# Patient Record
Sex: MALE | Race: WHITE
[De-identification: names, ages, dates, MRNs, and addresses within clinical notes are randomized per-mention and may not be internally consistent; named-entity substitution may affect disease eponyms.]

---

## 2017-06-14 ENCOUNTER — HOSPITAL ENCOUNTER (OUTPATIENT)
Dept: HOSPITAL 47 - MMGSC | Age: 42
End: 2017-06-14
Payer: MEDICAID

## 2017-06-14 DIAGNOSIS — L73.9: Primary | ICD-10-CM

## 2017-06-14 PROCEDURE — 87205 SMEAR GRAM STAIN: CPT

## 2017-06-14 PROCEDURE — 87070 CULTURE OTHR SPECIMN AEROBIC: CPT

## 2017-08-09 ENCOUNTER — HOSPITAL ENCOUNTER (OUTPATIENT)
Dept: HOSPITAL 47 - MMGSC | Age: 42
Discharge: HOME | End: 2017-08-09
Payer: COMMERCIAL

## 2017-08-09 DIAGNOSIS — R63.5: ICD-10-CM

## 2017-08-09 DIAGNOSIS — R79.89: Primary | ICD-10-CM

## 2017-08-09 LAB
ALP SERPL-CCNC: 48 U/L (ref 38–126)
ALT SERPL-CCNC: 57 U/L (ref 21–72)
ANION GAP SERPL CALC-SCNC: 13 MMOL/L
AST SERPL-CCNC: 32 U/L (ref 17–59)
BUN SERPL-SCNC: 15 MG/DL (ref 9–20)
CALCIUM SPEC-MCNC: 10.1 MG/DL (ref 8.4–10.2)
CHLORIDE SERPL-SCNC: 103 MMOL/L (ref 98–107)
CO2 SERPL-SCNC: 24 MMOL/L (ref 22–30)
GLUCOSE SERPL-MCNC: 87 MG/DL (ref 74–99)
NON-AFRICAN AMERICAN GFR(MDRD): >60
POTASSIUM SERPL-SCNC: 3.9 MMOL/L (ref 3.5–5.1)
PROT SERPL-MCNC: 7.6 G/DL (ref 6.3–8.2)
SODIUM SERPL-SCNC: 140 MMOL/L (ref 137–145)

## 2017-08-09 PROCEDURE — 84443 ASSAY THYROID STIM HORMONE: CPT

## 2017-08-09 PROCEDURE — 36415 COLL VENOUS BLD VENIPUNCTURE: CPT

## 2017-08-09 PROCEDURE — 80053 COMPREHEN METABOLIC PANEL: CPT

## 2017-08-09 PROCEDURE — 84439 ASSAY OF FREE THYROXINE: CPT

## 2019-06-18 ENCOUNTER — HOSPITAL ENCOUNTER (OUTPATIENT)
Dept: HOSPITAL 47 - RADUSWWP | Age: 44
Discharge: HOME | End: 2019-06-18
Attending: FAMILY MEDICINE
Payer: COMMERCIAL

## 2019-06-18 DIAGNOSIS — K76.0: Primary | ICD-10-CM

## 2019-06-18 PROCEDURE — 76705 ECHO EXAM OF ABDOMEN: CPT

## 2019-06-18 NOTE — US
EXAMINATION TYPE: US liver

 

DATE OF EXAM: 6/18/2019

 

COMPARISON: NONE

 

CLINICAL HISTORY: R94.5 Elevated LFT. Takes blood pressure medication.

 

EXAM MEASUREMENTS:

 

Liver Length:  16.1 cm   

Gallbladder Wall:  0.2 cm   

CBD:  0.3 cm

Right Kidney:  13.4 x 6.8 x 6.1 cm

 

 

 

Pancreas: Visualized portions wnl

Liver:  hyperechoic to right renal cortex suggests fatty liver; focal fatty sparing noted near gallbl
adder =   1.2 x 1.7 x 2.9cm

Gallbladder:  wnl

**Evidence for sonographic Larry's sign:  no

CBD:  wnl 

Right Kidney:  No hydronephrosis or masses seen 

 

Visualized pancreas shows no worrisome mass or ductal dilatation. Visualized liver is heterogeneously
 hyperechoic. No intrahepatic ductal dilatation. Evaluation for focal masses is suboptimal due to the
 heterogeneity. No surrounding ascites is seen. Gallbladder is identified without shadowing mobile ga
llstones. No hydronephrosis and right kidney.

 

IMPRESSION: Heterogeneous hyperechoic appearance of the liver favors product of diffuse fatty infiltr
ation.

## 2021-06-14 ENCOUNTER — HOSPITAL ENCOUNTER (OUTPATIENT)
Dept: HOSPITAL 47 - RADMRIMAIN | Age: 46
Discharge: HOME | End: 2021-06-14
Attending: FAMILY MEDICINE
Payer: COMMERCIAL

## 2021-06-14 DIAGNOSIS — M51.16: Primary | ICD-10-CM

## 2021-06-14 PROCEDURE — 72148 MRI LUMBAR SPINE W/O DYE: CPT

## 2021-06-14 NOTE — MR
EXAMINATION TYPE: MR lumbar spine wo con

 

DATE OF EXAM: 6/14/2021

 

COMPARISON: 10/4/2016

 

HISTORY: Low back pain down both legs since 2000.

 

CONTRAST: 0 mL intravenous Gadavist.

 

TECHNIQUE: 

Multiplanar, multisequence images of the lumbar spine were acquired.

 

FINDINGS:  

L5-S1: Mild disc bulge is present. There is increased signal on T2-weighted sequences within the post
erior disc space compatible with annular tear. No significant anterior thecal sac compression is evid
ent. Neural foramen are patent. No spinal canal stenosis is present. Disc desiccation is present. 

 

L4-L5: No significant disc bulge is evident. No spinal canal stenosis present. T2 sagittal images hav
e increased signal suggesting a small annular tear. Previous subligamentous disc herniation has large
ly resolved

 

L3-L4: There is disc bulging. This is central and slightly left paracentral. This is slightly larger 
comparison. No AP spinal canal stenosis present. There is some mild anterior thecal sac compression. 
Disc desiccation is present.

 

L2-L3: No significant disc bulge or disc herniation.  No spinal canal stenosis.  No foraminal stenosi
s.

 

L1-L2: No significant disc bulge or disc herniation.  No spinal canal stenosis.  No foraminal stenosi
s.

 

T12-L1: No significant disc bulge or disc herniation.  No spinal canal stenosis.  No foraminal stenos
is.

 

 

 

IMPRESSION:

1. Annular tears L4-5 and L5-S1.

2. Prior subligamentous disc herniation L4-5 appears largely resolved.

3. Increasing central left paracentral disc bulge at L3-4 with mild anterior thecal sac compression.

4. Mild disc bulge without spinal canal stenosis or neural foraminal stenosis